# Patient Record
Sex: MALE | Race: WHITE | NOT HISPANIC OR LATINO | URBAN - METROPOLITAN AREA
[De-identification: names, ages, dates, MRNs, and addresses within clinical notes are randomized per-mention and may not be internally consistent; named-entity substitution may affect disease eponyms.]

---

## 2017-05-01 ENCOUNTER — OFFICE (OUTPATIENT)
Dept: URBAN - METROPOLITAN AREA CLINIC 75 | Facility: CLINIC | Age: 57
End: 2017-05-01

## 2017-05-01 VITALS
SYSTOLIC BLOOD PRESSURE: 126 MMHG | HEART RATE: 86 BPM | DIASTOLIC BLOOD PRESSURE: 74 MMHG | WEIGHT: 215 LBS | HEIGHT: 71 IN

## 2017-05-01 DIAGNOSIS — K62.5 HEMORRHAGE OF ANUS AND RECTUM: ICD-10-CM

## 2017-05-01 DIAGNOSIS — R19.4 CHANGE IN BOWEL HABIT: ICD-10-CM

## 2017-05-01 PROCEDURE — 99244 OFF/OP CNSLTJ NEW/EST MOD 40: CPT | Performed by: INTERNAL MEDICINE

## 2017-05-01 NOTE — SERVICEHPINOTES
Thank you very much for allowing me to evaluate Mr. Hatfield. As you know he is a pleasant 56-year-old gentleman who tells me that in the past he would have episodes where he would notice a little trace of blood on the tissue with bowel movements but recently he says that the bleeding has increased and the tissue will be soaked. There is never any blood in the stool. There is no blood in the water it does not drip however. The bleeding is painless. His grandfather had colon cancer in his 70s. That does not increase the patient's risk according to current guidelines. Family history is otherwise negative for polyps colitis or colon cancer. He has never had a colonoscopy however.He's also had a change of bowel habits. He used an intermittent diarrhea. He now says he can have diarrhea for 3 or 4 times a week. When he does have diarrhea he can go for 5 times in the day. He is also had increased gas but there is no pain. There is no fevers or chills, he was in [place] in February but the symptoms started a few months after his trip. There is no weight loss, in fact he has gained 32 pounds. He has no nocturnal symptoms. There is no recent antibiotic use, there is no history of well water use.

## 2017-05-26 ENCOUNTER — ON CAMPUS - OUTPATIENT (OUTPATIENT)
Dept: URBAN - METROPOLITAN AREA HOSPITAL 108 | Facility: HOSPITAL | Age: 57
End: 2017-05-26

## 2017-05-26 DIAGNOSIS — D12.0 BENIGN NEOPLASM OF CECUM: ICD-10-CM

## 2017-05-26 DIAGNOSIS — K62.5 HEMORRHAGE OF ANUS AND RECTUM: ICD-10-CM

## 2017-05-26 DIAGNOSIS — R19.7 DIARRHEA, UNSPECIFIED: ICD-10-CM

## 2017-05-26 DIAGNOSIS — K64.0 FIRST DEGREE HEMORRHOIDS: ICD-10-CM

## 2017-05-26 DIAGNOSIS — R19.4 CHANGE IN BOWEL HABIT: ICD-10-CM

## 2017-05-26 PROCEDURE — 45380 COLONOSCOPY AND BIOPSY: CPT | Mod: 59 | Performed by: INTERNAL MEDICINE

## 2017-05-26 PROCEDURE — 45385 COLONOSCOPY W/LESION REMOVAL: CPT | Performed by: INTERNAL MEDICINE

## 2017-07-10 ENCOUNTER — OFFICE (OUTPATIENT)
Dept: URBAN - METROPOLITAN AREA CLINIC 75 | Facility: CLINIC | Age: 57
End: 2017-07-10

## 2017-07-10 VITALS — HEIGHT: 71 IN

## 2017-07-10 DIAGNOSIS — K64.4 RESIDUAL HEMORRHOIDAL SKIN TAGS: ICD-10-CM

## 2017-07-10 DIAGNOSIS — K62.5 HEMORRHAGE OF ANUS AND RECTUM: ICD-10-CM

## 2017-07-10 DIAGNOSIS — K64.1 SECOND DEGREE HEMORRHOIDS: ICD-10-CM

## 2017-07-10 PROCEDURE — 46221 LIGATION OF HEMORRHOID(S): CPT | Performed by: INTERNAL MEDICINE

## 2017-07-10 NOTE — SERVICEHPINOTES
Pleasant 56-year-old gentleman with a history of intermittent rectal bleeding. Recent colonoscopy was performed and he had evidence of internal hemorrhoids. He also has skin tags. Due to his bleeding symptoms he presents for hemorrhoid banding.

## 2017-08-07 ENCOUNTER — OFFICE (OUTPATIENT)
Dept: URBAN - METROPOLITAN AREA CLINIC 75 | Facility: CLINIC | Age: 57
End: 2017-08-07

## 2017-08-07 VITALS — HEIGHT: 71 IN

## 2017-08-07 DIAGNOSIS — K64.1 SECOND DEGREE HEMORRHOIDS: ICD-10-CM

## 2017-08-07 DIAGNOSIS — K64.4 RESIDUAL HEMORRHOIDAL SKIN TAGS: ICD-10-CM

## 2017-08-07 DIAGNOSIS — K62.5 HEMORRHAGE OF ANUS AND RECTUM: ICD-10-CM

## 2017-08-07 PROCEDURE — 46221 LIGATION OF HEMORRHOID(S): CPT | Performed by: INTERNAL MEDICINE

## 2017-08-07 NOTE — SERVICEHPINOTES
Pleasant 56-year-old gentleman with a history of intermittent rectal bleeding. Recent colonoscopy was performed and he had evidence of internal hemorrhoids. He also has skin tags. Due to his bleeding symptoms he presents for his 2nd  hemorrhoid banding

## 2017-08-21 ENCOUNTER — OFFICE (OUTPATIENT)
Dept: URBAN - METROPOLITAN AREA CLINIC 75 | Facility: CLINIC | Age: 57
End: 2017-08-21

## 2017-08-21 VITALS — HEIGHT: 71 IN

## 2017-08-21 DIAGNOSIS — K62.5 HEMORRHAGE OF ANUS AND RECTUM: ICD-10-CM

## 2017-08-21 DIAGNOSIS — K64.0 FIRST DEGREE HEMORRHOIDS: ICD-10-CM

## 2017-08-21 PROCEDURE — 46221 LIGATION OF HEMORRHOID(S): CPT | Performed by: INTERNAL MEDICINE

## 2017-08-21 NOTE — SERVICEHPINOTES
Pleasant 56-year-old gentleman with a history of intermittent rectal bleeding. Recent colonoscopy was performed and he had evidence of internal hemorrhoids. He also has skin tags. Due to his bleeding symptoms he presents for his 3rd hemorrhoid banding BRAssessment

## 2018-02-12 ENCOUNTER — OFFICE (OUTPATIENT)
Dept: URBAN - METROPOLITAN AREA CLINIC 75 | Facility: CLINIC | Age: 58
End: 2018-02-12

## 2018-02-12 VITALS
DIASTOLIC BLOOD PRESSURE: 76 MMHG | WEIGHT: 224 LBS | HEART RATE: 69 BPM | HEIGHT: 71 IN | SYSTOLIC BLOOD PRESSURE: 128 MMHG

## 2018-02-12 DIAGNOSIS — K62.5 HEMORRHAGE OF ANUS AND RECTUM: ICD-10-CM

## 2018-02-12 DIAGNOSIS — K64.0 FIRST DEGREE HEMORRHOIDS: ICD-10-CM

## 2018-02-12 DIAGNOSIS — R19.4 CHANGE IN BOWEL HABIT: ICD-10-CM

## 2018-02-12 DIAGNOSIS — Z86.010 PERSONAL HISTORY OF COLONIC POLYPS: ICD-10-CM

## 2018-02-12 PROCEDURE — 99213 OFFICE O/P EST LOW 20 MIN: CPT | Performed by: INTERNAL MEDICINE

## 2019-03-21 ENCOUNTER — OFFICE (OUTPATIENT)
Dept: URBAN - METROPOLITAN AREA CLINIC 75 | Facility: CLINIC | Age: 59
End: 2019-03-21

## 2019-03-21 VITALS
WEIGHT: 213 LBS | DIASTOLIC BLOOD PRESSURE: 80 MMHG | HEIGHT: 71 IN | SYSTOLIC BLOOD PRESSURE: 134 MMHG | HEART RATE: 82 BPM | RESPIRATION RATE: 16 BRPM

## 2019-03-21 DIAGNOSIS — K64.0 FIRST DEGREE HEMORRHOIDS: ICD-10-CM

## 2019-03-21 DIAGNOSIS — R19.4 CHANGE IN BOWEL HABIT: ICD-10-CM

## 2019-03-21 DIAGNOSIS — K62.5 HEMORRHAGE OF ANUS AND RECTUM: ICD-10-CM

## 2019-03-21 DIAGNOSIS — L91.8 OTHER HYPERTROPHIC DISORDERS OF THE SKIN: ICD-10-CM

## 2019-03-21 DIAGNOSIS — Z86.010 PERSONAL HISTORY OF COLONIC POLYPS: ICD-10-CM

## 2019-03-21 PROCEDURE — 99214 OFFICE O/P EST MOD 30 MIN: CPT | Performed by: INTERNAL MEDICINE

## 2019-03-21 RX ORDER — HYDROCORTISONE 25 MG/G
CREAM TOPICAL
Qty: 0 | Refills: 0 | Status: COMPLETED
End: 2024-04-22

## 2019-03-29 ENCOUNTER — OFFICE (OUTPATIENT)
Dept: URBAN - METROPOLITAN AREA CLINIC 75 | Facility: CLINIC | Age: 59
End: 2019-03-29

## 2019-03-29 VITALS — HEIGHT: 71 IN

## 2019-03-29 DIAGNOSIS — K64.0 FIRST DEGREE HEMORRHOIDS: ICD-10-CM

## 2019-03-29 DIAGNOSIS — K64.4 RESIDUAL HEMORRHOIDAL SKIN TAGS: ICD-10-CM

## 2019-03-29 DIAGNOSIS — K62.5 HEMORRHAGE OF ANUS AND RECTUM: ICD-10-CM

## 2019-03-29 PROCEDURE — 46221 LIGATION OF HEMORRHOID(S): CPT | Performed by: INTERNAL MEDICINE

## 2019-03-29 NOTE — SERVICEHPINOTES
I have not seen Mister Hatfield in a little over a year. The last time I saw him he was doing well. His bowels are regular, he's had diarrhea issues in the past. He had acute diarrhea 3 weeks ago but that's resolved. He is here however because he's been having rectal bleeding and "swelling" off and on for 2 months or so and over the last 3-4 weeks the bleeding has become more frequent. He'll note some blood with bowel movements, even says 15 minutes or so later he'll fill wet and when he checks there's blood in his underwear. He also has "swelling" but he says it is better. There is no fevers or chills. There is no pain with bowel movements. I did a colonoscopy on him in May 2017. He did have a small polyp. I also did hemorrhoid banding in 2017 and he's done well up until recently.He has no upper GI complaints. There is no reflux, dysphagia, odynophagia nausea or vomiting. There is no melena or hematemesis. He is in no distress, he does not look acutely ill. He does not smoke. He is a social drinker. Since I saw him he was started on Truvada as well as vitamin D.  He is here for hemorrhoid banding.

## 2019-04-12 ENCOUNTER — OFFICE (OUTPATIENT)
Dept: URBAN - METROPOLITAN AREA CLINIC 75 | Facility: CLINIC | Age: 59
End: 2019-04-12

## 2019-04-12 VITALS — HEIGHT: 71 IN

## 2019-04-12 DIAGNOSIS — K62.5 HEMORRHAGE OF ANUS AND RECTUM: ICD-10-CM

## 2019-04-12 DIAGNOSIS — K64.4 RESIDUAL HEMORRHOIDAL SKIN TAGS: ICD-10-CM

## 2019-04-12 DIAGNOSIS — K64.0 FIRST DEGREE HEMORRHOIDS: ICD-10-CM

## 2019-04-12 PROCEDURE — 46221 LIGATION OF HEMORRHOID(S): CPT | Performed by: INTERNAL MEDICINE

## 2019-04-12 NOTE — SERVICEHPINOTES
I have not seen Mister Hatfield in a little over a year. The last time I saw him he was doing well. His bowels are regular, he's had diarrhea issues in the past. He had acute diarrhea 3 weeks ago but that's resolved. He is here however because he's been having rectal bleeding and "swelling" off and on for 2 months or so and over the last 3-4 weeks the bleeding has become more frequent. He'll note some blood with bowel movements, even says 15 minutes or so later he'll fill wet and when he checks there's blood in his underwear. He also has "swelling" but he says it is better. There is no fevers or chills. There is no pain with bowel movements. I did a colonoscopy on him in May 2017. He did have a small polyp. I also did hemorrhoid banding in 2017 and he's done well up until recently.He has no upper GI complaints. There is no reflux, dysphagia, odynophagia nausea or vomiting. There is no melena or hematemesis. He is in no distress, he does not look acutely ill. He does not smoke. He is a social drinker. Since I saw him he was started on Truvada as well as vitamin D. He is here for his 2nd hemorrhoid banding. He is having less bleeding.

## 2019-04-26 ENCOUNTER — OFFICE (OUTPATIENT)
Dept: URBAN - METROPOLITAN AREA CLINIC 75 | Facility: CLINIC | Age: 59
End: 2019-04-26

## 2019-04-26 VITALS — HEIGHT: 71 IN

## 2019-04-26 DIAGNOSIS — K64.0 FIRST DEGREE HEMORRHOIDS: ICD-10-CM

## 2019-04-26 PROCEDURE — 46221 LIGATION OF HEMORRHOID(S): CPT | Performed by: INTERNAL MEDICINE

## 2019-04-26 NOTE — SERVICEHPINOTES
I have not seen Mister Hatfield in a little over a year. The last time I saw him he was doing well. His bowels are regular, he's had diarrhea issues in the past. He had acute diarrhea 3 weeks ago but that's resolved. He is here however because he's been having rectal bleeding and "swelling" off and on for 2 months or so and over the last 3-4 weeks the bleeding has become more frequent. He'll note some blood with bowel movements, even says 15 minutes or so later he'll fill wet and when he checks there's blood in his underwear. He also has "swelling" but he says it is better. There is no fevers or chills. There is no pain with bowel movements. I did a colonoscopy on him in May 2017. He did have a small polyp. I also did hemorrhoid banding in 2017 and he's done well up until recently.He has no upper GI complaints. There is no reflux, dysphagia, odynophagia nausea or vomiting. There is no melena or hematemesis. He is in no distress, he does not look acutely ill. He does not smoke. He is a social drinker. Since I saw him he was started on Truvada as well as vitamin D. He is here for his 3rd hemorrhoid banding. He is having less bleeding.

## 2019-06-21 ENCOUNTER — OFFICE (OUTPATIENT)
Dept: URBAN - METROPOLITAN AREA CLINIC 75 | Facility: CLINIC | Age: 59
End: 2019-06-21

## 2019-06-21 VITALS
SYSTOLIC BLOOD PRESSURE: 126 MMHG | HEART RATE: 88 BPM | DIASTOLIC BLOOD PRESSURE: 86 MMHG | HEIGHT: 71 IN | RESPIRATION RATE: 16 BRPM | WEIGHT: 207 LBS

## 2019-06-21 DIAGNOSIS — K64.0 FIRST DEGREE HEMORRHOIDS: ICD-10-CM

## 2019-06-21 DIAGNOSIS — R19.4 CHANGE IN BOWEL HABIT: ICD-10-CM

## 2019-06-21 DIAGNOSIS — K62.5 HEMORRHAGE OF ANUS AND RECTUM: ICD-10-CM

## 2019-06-21 DIAGNOSIS — Z86.010 PERSONAL HISTORY OF COLONIC POLYPS: ICD-10-CM

## 2019-06-21 DIAGNOSIS — L91.8 OTHER HYPERTROPHIC DISORDERS OF THE SKIN: ICD-10-CM

## 2019-06-21 PROCEDURE — 99213 OFFICE O/P EST LOW 20 MIN: CPT | Performed by: NURSE PRACTITIONER

## 2019-08-14 VITALS
SYSTOLIC BLOOD PRESSURE: 113 MMHG | SYSTOLIC BLOOD PRESSURE: 124 MMHG | WEIGHT: 199 LBS | DIASTOLIC BLOOD PRESSURE: 79 MMHG | SYSTOLIC BLOOD PRESSURE: 120 MMHG | SYSTOLIC BLOOD PRESSURE: 97 MMHG | DIASTOLIC BLOOD PRESSURE: 76 MMHG | DIASTOLIC BLOOD PRESSURE: 71 MMHG | OXYGEN SATURATION: 96 % | SYSTOLIC BLOOD PRESSURE: 104 MMHG | HEART RATE: 67 BPM | HEIGHT: 71 IN | HEART RATE: 62 BPM | HEART RATE: 66 BPM | HEART RATE: 69 BPM | RESPIRATION RATE: 18 BRPM | DIASTOLIC BLOOD PRESSURE: 83 MMHG | RESPIRATION RATE: 19 BRPM | DIASTOLIC BLOOD PRESSURE: 70 MMHG | DIASTOLIC BLOOD PRESSURE: 47 MMHG | SYSTOLIC BLOOD PRESSURE: 115 MMHG | RESPIRATION RATE: 16 BRPM | SYSTOLIC BLOOD PRESSURE: 98 MMHG | DIASTOLIC BLOOD PRESSURE: 59 MMHG | RESPIRATION RATE: 20 BRPM | HEART RATE: 65 BPM | HEART RATE: 64 BPM | DIASTOLIC BLOOD PRESSURE: 65 MMHG | RESPIRATION RATE: 21 BRPM | OXYGEN SATURATION: 97 % | DIASTOLIC BLOOD PRESSURE: 72 MMHG | SYSTOLIC BLOOD PRESSURE: 133 MMHG | SYSTOLIC BLOOD PRESSURE: 106 MMHG | TEMPERATURE: 97.5 F | TEMPERATURE: 97.9 F | HEART RATE: 56 BPM | HEART RATE: 63 BPM | DIASTOLIC BLOOD PRESSURE: 62 MMHG

## 2019-08-14 PROBLEM — Z86.010 SURVEILLANCE DUE TO PRIOR COLONIC NEOPLASIA: Status: ACTIVE | Noted: 2019-08-15

## 2019-08-15 ENCOUNTER — AMBULATORY SURGICAL CENTER (OUTPATIENT)
Dept: URBAN - METROPOLITAN AREA SURGERY 17 | Facility: SURGERY | Age: 59
End: 2019-08-15

## 2019-08-15 DIAGNOSIS — K57.30 DIVERTICULOSIS OF LARGE INTESTINE WITHOUT PERFORATION OR ABS: ICD-10-CM

## 2019-08-15 DIAGNOSIS — K62.5 HEMORRHAGE OF ANUS AND RECTUM: ICD-10-CM

## 2019-08-15 DIAGNOSIS — K64.8 OTHER HEMORRHOIDS: ICD-10-CM

## 2019-08-15 PROCEDURE — 45378 DIAGNOSTIC COLONOSCOPY: CPT | Performed by: INTERNAL MEDICINE

## 2019-11-22 ENCOUNTER — OFFICE (OUTPATIENT)
Dept: URBAN - METROPOLITAN AREA CLINIC 75 | Facility: CLINIC | Age: 59
End: 2019-11-22

## 2019-11-22 VITALS
HEIGHT: 71 IN | OXYGEN SATURATION: 97 % | SYSTOLIC BLOOD PRESSURE: 130 MMHG | HEART RATE: 65 BPM | WEIGHT: 208 LBS | DIASTOLIC BLOOD PRESSURE: 98 MMHG

## 2019-11-22 DIAGNOSIS — R19.4 CHANGE IN BOWEL HABIT: ICD-10-CM

## 2019-11-22 DIAGNOSIS — K62.5 HEMORRHAGE OF ANUS AND RECTUM: ICD-10-CM

## 2019-11-22 DIAGNOSIS — Z86.010 PERSONAL HISTORY OF COLONIC POLYPS: ICD-10-CM

## 2019-11-22 DIAGNOSIS — L91.8 OTHER HYPERTROPHIC DISORDERS OF THE SKIN: ICD-10-CM

## 2019-11-22 DIAGNOSIS — R15.1 FECAL SMEARING: ICD-10-CM

## 2019-11-22 PROBLEM — K64.8 BLEEDING INTERNAL HEMORRHOIDS: Status: ACTIVE | Noted: 2019-04-26

## 2019-11-22 PROBLEM — K64.0 FIRST DEGREE HEMORRHOIDS: Status: ACTIVE | Noted: 2017-07-10

## 2019-11-22 PROBLEM — K57.30 DVRTCLOS OF LG INT W/O PERFORATION OR ABSCESS W/O BLEEDING: Status: ACTIVE | Noted: 2019-08-15

## 2019-11-22 PROCEDURE — 99213 OFFICE O/P EST LOW 20 MIN: CPT | Performed by: INTERNAL MEDICINE

## 2024-04-22 ENCOUNTER — OFFICE (OUTPATIENT)
Dept: URBAN - METROPOLITAN AREA CLINIC 64 | Facility: CLINIC | Age: 64
End: 2024-04-22

## 2024-04-22 VITALS
WEIGHT: 204 LBS | DIASTOLIC BLOOD PRESSURE: 93 MMHG | HEART RATE: 58 BPM | DIASTOLIC BLOOD PRESSURE: 94 MMHG | SYSTOLIC BLOOD PRESSURE: 139 MMHG | SYSTOLIC BLOOD PRESSURE: 141 MMHG | HEIGHT: 71 IN

## 2024-04-22 DIAGNOSIS — R19.4 CHANGE IN BOWEL HABIT: ICD-10-CM

## 2024-04-22 DIAGNOSIS — R15.2 FECAL URGENCY: ICD-10-CM

## 2024-04-22 PROCEDURE — 99204 OFFICE O/P NEW MOD 45 MIN: CPT | Performed by: INTERNAL MEDICINE

## 2024-04-22 RX ORDER — DICYCLOMINE HYDROCHLORIDE 20 MG/1
40 TABLET ORAL
Qty: 60 | Refills: 11 | Status: ACTIVE
Start: 2024-04-22

## 2024-07-02 ENCOUNTER — OFFICE (OUTPATIENT)
Dept: URBAN - METROPOLITAN AREA CLINIC 64 | Facility: CLINIC | Age: 64
End: 2024-07-02

## 2024-07-02 VITALS
HEART RATE: 65 BPM | HEIGHT: 71 IN | WEIGHT: 201 LBS | SYSTOLIC BLOOD PRESSURE: 123 MMHG | DIASTOLIC BLOOD PRESSURE: 84 MMHG

## 2024-07-02 DIAGNOSIS — R19.4 CHANGE IN BOWEL HABIT: ICD-10-CM

## 2024-07-02 DIAGNOSIS — R15.1 FECAL SMEARING: ICD-10-CM

## 2024-07-02 DIAGNOSIS — R15.2 FECAL URGENCY: ICD-10-CM

## 2024-07-02 PROCEDURE — 99213 OFFICE O/P EST LOW 20 MIN: CPT | Performed by: NURSE PRACTITIONER

## 2024-09-04 ENCOUNTER — OFFICE (OUTPATIENT)
Age: 64
End: 2024-09-04

## 2024-09-04 ENCOUNTER — OFFICE (OUTPATIENT)
Dept: URBAN - METROPOLITAN AREA CLINIC 64 | Facility: CLINIC | Age: 64
End: 2024-09-04

## 2024-09-04 VITALS
HEIGHT: 71 IN | HEART RATE: 67 BPM | HEIGHT: 71 IN | DIASTOLIC BLOOD PRESSURE: 78 MMHG | WEIGHT: 202 LBS | HEIGHT: 71 IN | HEIGHT: 71 IN | HEART RATE: 67 BPM | HEIGHT: 71 IN | HEART RATE: 67 BPM | SYSTOLIC BLOOD PRESSURE: 123 MMHG | HEART RATE: 67 BPM | SYSTOLIC BLOOD PRESSURE: 123 MMHG | DIASTOLIC BLOOD PRESSURE: 78 MMHG | WEIGHT: 202 LBS | HEIGHT: 71 IN | WEIGHT: 202 LBS | DIASTOLIC BLOOD PRESSURE: 78 MMHG | DIASTOLIC BLOOD PRESSURE: 78 MMHG | HEART RATE: 67 BPM | HEART RATE: 67 BPM | SYSTOLIC BLOOD PRESSURE: 123 MMHG | WEIGHT: 202 LBS | WEIGHT: 202 LBS | DIASTOLIC BLOOD PRESSURE: 78 MMHG | WEIGHT: 202 LBS | SYSTOLIC BLOOD PRESSURE: 123 MMHG | HEART RATE: 67 BPM | WEIGHT: 202 LBS | DIASTOLIC BLOOD PRESSURE: 78 MMHG | DIASTOLIC BLOOD PRESSURE: 78 MMHG | SYSTOLIC BLOOD PRESSURE: 123 MMHG | SYSTOLIC BLOOD PRESSURE: 123 MMHG | SYSTOLIC BLOOD PRESSURE: 123 MMHG | HEIGHT: 71 IN

## 2024-09-04 DIAGNOSIS — R15.1 FECAL SMEARING: ICD-10-CM

## 2024-09-04 DIAGNOSIS — R15.2 FECAL URGENCY: ICD-10-CM

## 2024-09-04 DIAGNOSIS — R19.4 CHANGE IN BOWEL HABIT: ICD-10-CM

## 2024-09-04 PROCEDURE — 99213 OFFICE O/P EST LOW 20 MIN: CPT | Performed by: NURSE PRACTITIONER

## 2025-03-05 ENCOUNTER — OFFICE (OUTPATIENT)
Dept: URBAN - METROPOLITAN AREA CLINIC 64 | Facility: CLINIC | Age: 65
End: 2025-03-05
Payer: COMMERCIAL

## 2025-03-05 VITALS
SYSTOLIC BLOOD PRESSURE: 149 MMHG | HEART RATE: 68 BPM | SYSTOLIC BLOOD PRESSURE: 144 MMHG | DIASTOLIC BLOOD PRESSURE: 105 MMHG | HEIGHT: 71 IN | DIASTOLIC BLOOD PRESSURE: 106 MMHG | WEIGHT: 212 LBS

## 2025-03-05 DIAGNOSIS — R19.4 CHANGE IN BOWEL HABIT: ICD-10-CM

## 2025-03-05 DIAGNOSIS — R15.2 FECAL URGENCY: ICD-10-CM

## 2025-03-05 DIAGNOSIS — R15.1 FECAL SMEARING: ICD-10-CM

## 2025-03-05 PROCEDURE — 99213 OFFICE O/P EST LOW 20 MIN: CPT | Performed by: NURSE PRACTITIONER

## 2025-06-17 ENCOUNTER — OFFICE VISIT (OUTPATIENT)
Dept: CARDIOLOGY | Facility: CLINIC | Age: 65
End: 2025-06-17
Payer: COMMERCIAL

## 2025-06-17 VITALS
DIASTOLIC BLOOD PRESSURE: 96 MMHG | HEIGHT: 72 IN | OXYGEN SATURATION: 95 % | WEIGHT: 213 LBS | BODY MASS INDEX: 28.85 KG/M2 | SYSTOLIC BLOOD PRESSURE: 142 MMHG | HEART RATE: 70 BPM

## 2025-06-17 DIAGNOSIS — I10 PRIMARY HYPERTENSION: ICD-10-CM

## 2025-06-17 DIAGNOSIS — R55 SYNCOPE AND COLLAPSE: Primary | ICD-10-CM

## 2025-06-17 PROCEDURE — 99203 OFFICE O/P NEW LOW 30 MIN: CPT | Performed by: INTERNAL MEDICINE

## 2025-06-17 RX ORDER — HYDROCHLOROTHIAZIDE 25 MG/1
TABLET ORAL
COMMUNITY
Start: 2025-04-16

## 2025-06-17 RX ORDER — POTASSIUM CHLORIDE 1500 MG/1
TABLET, EXTENDED RELEASE ORAL
COMMUNITY
Start: 2025-06-11

## 2025-06-17 RX ORDER — BUPROPION HYDROCHLORIDE 300 MG/1
TABLET ORAL
COMMUNITY
Start: 2025-04-16

## 2025-06-17 RX ORDER — MONTELUKAST SODIUM 10 MG/1
TABLET ORAL
COMMUNITY
Start: 2025-03-23

## 2025-06-17 RX ORDER — ATORVASTATIN CALCIUM 80 MG/1
TABLET, FILM COATED ORAL
COMMUNITY
Start: 2025-04-16

## 2025-06-17 RX ORDER — POTASSIUM CHLORIDE 750 MG/1
TABLET, EXTENDED RELEASE ORAL
COMMUNITY
Start: 2025-03-23

## 2025-06-17 RX ORDER — FLUTICASONE PROPIONATE 50 MCG
1 SPRAY, SUSPENSION (ML) NASAL
COMMUNITY

## 2025-06-17 RX ORDER — LISINOPRIL 5 MG/1
TABLET ORAL
COMMUNITY
Start: 2025-04-16

## 2025-06-17 RX ORDER — EMTRICITABINE AND TENOFOVIR ALAFENAMIDE 200; 25 MG/1; MG/1
1 TABLET ORAL DAILY
COMMUNITY
Start: 2025-05-14

## 2025-06-17 RX ORDER — CETIRIZINE HYDROCHLORIDE 10 MG/1
10 TABLET ORAL DAILY
COMMUNITY

## 2025-06-17 RX ORDER — VALACYCLOVIR HYDROCHLORIDE 1 G/1
TABLET, FILM COATED ORAL
COMMUNITY
Start: 2025-04-29

## 2025-06-17 NOTE — PROGRESS NOTES
Progress note      Name: Mario Graham ADMIT: (Not on file)   : 1960  PCP: Bhumika Flores APRN    MRN: [id number] LOS: 0 days   AGE/SEX: 64 y.o. male  ROOM: Room/bed info not found     Chief Complaint   Patient presents with    Consult    Slow Heart Rate       Subjective       History of present illness  Mario Graham is a 65-year-old male patient, who has hypertension, dyslipidemia, no prior history of coronary artery disease, has a family history of coronary disease including in his sister who had heart attacks starting in her 30s.  Patient is here today for evaluation of symptoms of extreme lightheadedness, near syncopal episodes head rush which last 10 to 15 minutes.  On 1 instance he did have a passing out spell as well.  After the event blood pressure has been on the high side and pulse in the 50s.    Past Medical History:   Diagnosis Date    Hypertension 2017     History reviewed. No pertinent surgical history.  Family History   Problem Relation Age of Onset    Heart disease Father     Heart disease Paternal Grandmother     Heart disease Sister      Social History     Tobacco Use    Smoking status: Never     Passive exposure: Never    Smokeless tobacco: Never   Vaping Use    Vaping status: Never Used   Substance Use Topics    Alcohol use: Yes     Alcohol/week: 7.0 standard drinks of alcohol     Types: 7 Cans of beer per week    Drug use: Never       Current Outpatient Medications:     atorvastatin (LIPITOR) 80 MG tablet, , Disp: , Rfl:     buPROPion XL (WELLBUTRIN XL) 300 MG 24 hr tablet, , Disp: , Rfl:     cetirizine (zyrTEC) 10 MG tablet, Take 1 tablet by mouth Daily., Disp: , Rfl:     Descovy 200-25 MG per tablet, Take 1 tablet by mouth Daily., Disp: , Rfl:     fluticasone (FLONASE) 50 MCG/ACT nasal spray, Administer 1 spray into the nostril(s) as directed by provider., Disp: , Rfl:     hydroCHLOROthiazide 25 MG tablet, , Disp: , Rfl:     Klor-Con M20 20 MEQ CR tablet, , Disp: , Rfl:      lisinopril (PRINIVIL,ZESTRIL) 5 MG tablet, , Disp: , Rfl:     montelukast (SINGULAIR) 10 MG tablet, , Disp: , Rfl:     potassium chloride 10 MEQ CR tablet, , Disp: , Rfl:     valACYclovir (VALTREX) 1000 MG tablet, , Disp: , Rfl:   Allergies:  Patient has no known allergies.      Physical Exam  VITALS REVIEWED    General:      well developed, in no acute distress.    Head:      normocephalic and atraumatic.    Eyes:      PERRL/EOM intact, conjunctiva and sclera clear with out nystagmus.    Neck:      no masses, thyromegaly,  trachea central with normal respiratory effort and PMI displaced laterally  Lungs:      Clear to auscultation bilaterally  Heart:       Regular rate and rhythm, no murmur  Msk:      no deformity or scoliosis noted of thoracic or lumbar spine.    Pulses:      pulses normal in all 4 extremities.    Extremities:       No lower extremity edema  Neurologic:      no focal deficits.   alert oriented x3  Skin:      intact without lesions or rashes.    Psych:      alert and cooperative; normal mood and affect; normal attention span and concentration.      Result Review :               Pertinent cardiac workup    6/2/2025 normal sinus rhythm      Procedures        Assessment and Plan      Mario Graham is a 64-year-old male patient who has no personal history of coronary artery disease but has family history of it, he does have dyslipidemia and high blood pressure but is on medication for it, his lipid profile was excellent.  Patient is here today to discuss about symptoms of lightheadedness, near syncopal episodes which happen sporadically.  He denies any exertional chest pain or shortness of breath.  He has been told that it could be due to dehydration and that is why he drinks more water and takes liquid IV.  In the past she is worn a 48-hour monitor but it was unrevealing.  He does not have any anginal symptoms to warrant ischemic workup, however I think CT calcium score would be a good screening tool  for him since he has family history.  Also I would like to start with a 2-week Holter monitor to see if he is having any significant bradycardic episodes.  He is not on any rate slowing agents.  If the 2-week monitor is not diagnostic then we might consider loop recorder.    Diagnoses and all orders for this visit:    1. Syncope and collapse (Primary)  -     CT Cardiac Calcium Score Without Dye; Future  -     Holter Monitor - 72 Hour Up To 15 Days; Future    2. Primary hypertension           Return in about 4 weeks (around 7/15/2025).  Patient was given instructions and counseling regarding his condition or for health maintenance advice. Please see specific information pulled into the AVS if appropriate.         Electronically signed by Sandeep Valadez MD, 06/17/25, 3:52 PM EDT.

## 2025-06-20 ENCOUNTER — PATIENT ROUNDING (BHMG ONLY) (OUTPATIENT)
Dept: CARDIOLOGY | Facility: CLINIC | Age: 65
End: 2025-06-20
Payer: COMMERCIAL

## 2025-06-20 NOTE — PROGRESS NOTES
A My-Chart message has been sent to the patient for PATIENT ROUNDING with [facility]

## 2025-06-23 ENCOUNTER — TELEPHONE (OUTPATIENT)
Dept: CARDIOLOGY | Facility: CLINIC | Age: 65
End: 2025-06-23
Payer: COMMERCIAL

## 2025-06-23 NOTE — TELEPHONE ENCOUNTER
Caller: [employer]    Relationship to patient:     Best call back number: [phone]    Patient is needing: [employer] IS NEEDING OFFICE NOTES FOR PT ON 06.17.2025 FAXED TO THEIR OFFICE. FAX NUMBER [phone]

## 2025-07-22 ENCOUNTER — OFFICE VISIT (OUTPATIENT)
Dept: CARDIOLOGY | Facility: CLINIC | Age: 65
End: 2025-07-22
Payer: COMMERCIAL

## 2025-07-22 VITALS
DIASTOLIC BLOOD PRESSURE: 91 MMHG | WEIGHT: 210 LBS | HEIGHT: 72 IN | SYSTOLIC BLOOD PRESSURE: 145 MMHG | BODY MASS INDEX: 28.44 KG/M2 | HEART RATE: 61 BPM | OXYGEN SATURATION: 99 %

## 2025-07-22 DIAGNOSIS — R55 SYNCOPE AND COLLAPSE: ICD-10-CM

## 2025-07-22 DIAGNOSIS — I10 PRIMARY HYPERTENSION: Primary | ICD-10-CM

## 2025-07-22 DIAGNOSIS — R00.2 PALPITATIONS: ICD-10-CM

## 2025-07-22 PROCEDURE — 99212 OFFICE O/P EST SF 10 MIN: CPT | Performed by: INTERNAL MEDICINE

## 2025-07-22 NOTE — PROGRESS NOTES
Progress note      Name: Mario Graham ADMIT: (Not on file)   : 1960  PCP: Bhumika Flores APRN    MRN: [id number] LOS: 0 days   AGE/SEX: 64 y.o. male  ROOM: Room/bed info not found     Chief Complaint   Patient presents with    Follow-up     DISCUSS HOLTER        Subjective       History of present illness  Mario Graham is a 64-year-old male patient, hypertension, dyslipidemia, no history of coronary artery disease but has family history of CAD including sister who had a heart attack starting in the 30s.  Patient was seen for symptoms of lightheadedness near syncope head rash lasting 10 to 15 minutes.  Holter monitor was ordered prior to this visit also CT calcium score.      Past Medical History:   Diagnosis Date    Hypertension 2017     History reviewed. No pertinent surgical history.  Family History   Problem Relation Age of Onset    Heart disease Father     Heart disease Paternal Grandmother     Heart disease Sister      Social History     Tobacco Use    Smoking status: Never     Passive exposure: Never    Smokeless tobacco: Never   Vaping Use    Vaping status: Never Used   Substance Use Topics    Alcohol use: Yes     Alcohol/week: 7.0 standard drinks of alcohol     Types: 7 Cans of beer per week    Drug use: Never       Current Outpatient Medications:     atorvastatin (LIPITOR) 80 MG tablet, , Disp: , Rfl:     buPROPion XL (WELLBUTRIN XL) 300 MG 24 hr tablet, , Disp: , Rfl:     cetirizine (zyrTEC) 10 MG tablet, Take 1 tablet by mouth Daily., Disp: , Rfl:     Descovy 200-25 MG per tablet, Take 1 tablet by mouth Daily., Disp: , Rfl:     fluticasone (FLONASE) 50 MCG/ACT nasal spray, Administer 1 spray into the nostril(s) as directed by provider., Disp: , Rfl:     hydroCHLOROthiazide 25 MG tablet, , Disp: , Rfl:     Klor-Con M20 20 MEQ CR tablet, , Disp: , Rfl:     lisinopril (PRINIVIL,ZESTRIL) 5 MG tablet, , Disp: , Rfl:     montelukast (SINGULAIR) 10 MG tablet, , Disp: , Rfl:     potassium  chloride 10 MEQ CR tablet, , Disp: , Rfl:     valACYclovir (VALTREX) 1000 MG tablet, , Disp: , Rfl:   Allergies:  Patient has no known allergies.      Physical Exam  VITALS REVIEWED    General:      well developed, in no acute distress.    Head:      normocephalic and atraumatic.    Eyes:      PERRL/EOM intact, conjunctiva and sclera clear with out nystagmus.    Neck:      no masses, thyromegaly,  trachea central with normal respiratory effort and PMI displaced laterally  Lungs:      Clear to auscultation bilaterally  Heart:       Regular rate and rhythm, no murmur  Msk:      no deformity or scoliosis noted of thoracic or lumbar spine.    Pulses:      pulses normal in all 4 extremities.    Extremities:       No lower extremity edema  Neurologic:      no focal deficits.   alert oriented x3  Skin:      intact without lesions or rashes.    Psych:      alert and cooperative; normal mood and affect; normal attention span and concentration.      Result Review :               Pertinent cardiac workup    6/2/2025 normal sinus rhythm  Holter monitor 14 days starting on 6/17/2025.  Sinus rhythm throughout, no significant arrhythmias.       Procedures        Assessment and Plan      Mario Graham is a 64-year-old male patient of has strong family history of coronary artery disease, has dyslipidemia and high blood pressure on medications for both.  Patient was evaluated for lightheadedness near syncopal episodes.  A Holter monitor was done which was essentially normal.  Given his risk factors specially the family history CT calcium score was ordered, he has not had it done yet.  He does not necessarily have anginal symptoms.  Will await CT calcium score and if negative or negligible then probably no further workup is indicated at this time.    Diagnoses and all orders for this visit:    1. Primary hypertension (Primary)    2. Syncope and collapse    3. Palpitations           Return in about 6 months (around  1/22/2026).  Patient was given instructions and counseling regarding his condition or for health maintenance advice. Please see specific information pulled into the AVS if appropriate.     Electronically signed by Sandeep Valadez MD, 07/22/25, 4:20 PM EDT.

## 2025-08-08 ENCOUNTER — HOSPITAL ENCOUNTER (OUTPATIENT)
Dept: CT IMAGING | Facility: HOSPITAL | Age: 65
Discharge: HOME | End: 2025-08-08
Admitting: INTERNAL MEDICINE

## 2025-08-08 DIAGNOSIS — R55 SYNCOPE AND COLLAPSE: ICD-10-CM

## 2025-08-08 PROCEDURE — 75571 CT HRT W/O DYE W/CA TEST: CPT
